# Patient Record
Sex: MALE | Race: WHITE
[De-identification: names, ages, dates, MRNs, and addresses within clinical notes are randomized per-mention and may not be internally consistent; named-entity substitution may affect disease eponyms.]

---

## 2017-09-13 ENCOUNTER — HOSPITAL ENCOUNTER (EMERGENCY)
Dept: HOSPITAL 76 - ED | Age: 72
Discharge: HOME | End: 2017-09-13
Payer: MEDICARE

## 2017-09-13 VITALS — SYSTOLIC BLOOD PRESSURE: 139 MMHG | DIASTOLIC BLOOD PRESSURE: 88 MMHG
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
## 2017-09-13 DIAGNOSIS — Z23: ICD-10-CM

## 2017-09-13 DIAGNOSIS — I10: ICD-10-CM
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
## 2017-09-13 DIAGNOSIS — S61.211A: Primary | ICD-10-CM

## 2017-09-13 DIAGNOSIS — Z79.82: ICD-10-CM

## 2017-09-13 DIAGNOSIS — S61.213A: ICD-10-CM
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
## 2017-09-13 PROCEDURE — 90715 TDAP VACCINE 7 YRS/> IM: CPT

## 2017-09-13 PROCEDURE — 12032 INTMD RPR S/A/T/EXT 2.6-7.5: CPT

## 2017-09-13 PROCEDURE — 90471 IMMUNIZATION ADMIN: CPT

## 2017-09-13 PROCEDURE — 73130 X-RAY EXAM OF HAND: CPT

## 2017-09-13 PROCEDURE — 99283 EMERGENCY DEPT VISIT LOW MDM: CPT

## 2017-09-13 NOTE — XRAY PRELIMINARY REPORT
Accession: [id number]

Exam: XR Hand 3 View LT

 

IMPRESSION: 

1. Negative for evidence of a fracture or radiopaque foreign body. 

2. Soft tissue injury distal index, long, and ring fingers.

 

[facility]

 

SITE ID: [id number]

## 2017-09-13 NOTE — XRAY REPORT
EXAM:

LEFT HAND RADIOGRAPHY

 

EXAM DATE: 9/13/2017 01:14 PM.

 

CLINICAL HISTORY: Injury lac. Table saw injury.

 

COMPARISON: None.

 

TECHNIQUE: 3 views.

 

FINDINGS: 

Bones: The bones appear to be intact without evidence of a fracture.

 

Joints: Normal. No subluxations.

 

Soft Tissues: There is soft tissue swelling and overlying bandage material in the distal index, long,
 and ring fingers. No radiopaque foreign bodies.

 

IMPRESSION: 

1. Negative for evidence of a fracture or radiopaque foreign body. 

2. Soft tissue injury distal index, long, and ring fingers.

 

[facility]

Referring Provider Line: [phone]

 

SITE ID: [id number]

## 2017-09-13 NOTE — ED PHYSICIAN DOCUMENTATION
PD HPI UPPER EXT INJURY





- Stated complaint


Stated Complaint: L HAND LAC





- Chief complaint


Chief Complaint: Laceration





- History obtained from


History obtained from: Patient





- History of Present Illness


Location: Other (Right-handed gentleman with unknown tetanus status cut the 

second third and fourth digits of the left hand just prior to arrival on a 

table saw while working at home.)





Review of Systems


Constitutional: reports: Reviewed and negative


Nose: reports: Reviewed and negative


Throat: reports: Reviewed and negative





PD PAST MEDICAL HISTORY





- Past Medical History


Cardiovascular: Hypertension





- Past Surgical History


Past Surgical History: Yes





- Present Medications


Home Medications: 


 Ambulatory Orders











 Medication  Instructions  Recorded  Confirmed


 


Aspirin [Aspir 81]  11/18/15 11/18/15


 


Lisinopril/Hydrochlorothiazide  BID 11/18/15 11/18/15





[Lisinopril-Hctz 20-25 mg Tab]   


 


Glucosamine Sulfate 500 mg PO 11/28/16 


 


Cephalexin [Keflex] 500 mg PO QID #40 capsule 09/13/17 














- Allergies


Allergies/Adverse Reactions: 


 Allergies











Allergy/AdvReac Type Severity Reaction Status Date / Time


 


No Known Drug Allergies Allergy   Verified 11/18/15 11:04














- Social History


Does the pt smoke?: No


Smoking Status: Never smoker


Does the pt have substance abuse?: No





- Immunizations


Immunizations are current?: Yes





PD ED PE NORMAL





- Vitals


Vital signs reviewed: Yes





- General


General: Alert and oriented X 3, No acute distress





- Extremities


Extremities: Other (Left hand: He has a shallow jagged laceration of the pulp 

of the fourth finger, a deeper laceration on the radial side of the middle 

finger and second finger, he has mildly diminished sensation on the tip of the 

middle finger but not absent.  Tendon function, both flexor and and extensor is 

intact in all digits.)





- Neuro


Neuro: Alert and oriented X 3, Normal speech





- Psych


Psych: Normal mood, Normal affect





Results





- Vitals


Vitals: 


 Vital Signs - 24 hr











  09/13/17





  12:23


 


Temperature 36.5 C


 


Heart Rate 74


 


Respiratory 18





Rate 


 


Blood Pressure 146/93 H


 


O2 Saturation 98








 Oxygen











O2 Source                      Room air

















Procedures





- Laceration (location)


  ** L HAND 2ND/3RD/4TH DIGITS


Length in cm: 5


Wound type: Other (He had contaminated lacerations of the second through fourth 

fingers, on exploration he does have a flexor tendon laceration at the 

insertion on the second digit.)


Anesthesia: Lidocaine 1%, With bicarb


Wound Preparation: Hibiclens, Irrigated copiously NS


Skin layer closure: Nylon, Interrupted, Size #-0 - enter number (4-0)


Other: Patient tolerated well, No complications, Neurovascular intact, Tetanus 

booster given


Complexity: Intermediate





PD MEDICAL DECISION MAKING





- ED course


ED course: 





On exploration during repair.  He has a visible laceration of the flexor tendon 

of the second digit at the level of the insertion onto the distal phalanx.  

This was discussed with him.  Function seems intact though.  The wound was 

debrided, irrigated and closed and he knows he needs to follow-up with hand 

surgeon.





Departure





- Departure


Disposition: 01 Home, Self Care


Clinical Impression: 


Laceration of multiple sites of hand and fingers


Qualifiers:


 Encounter type: initial encounter Laterality: left Qualified Code(s): S61.412A 

- Laceration without foreign body of left hand, initial encounter


Condition: Good


Record reviewed to determine appropriate education?: Yes


Instructions:  ED Laceration All


Prescriptions: 


Cephalexin [Keflex] 500 mg PO QID #40 capsule


Comments: 


You do have a laceration of the flexor tendon, partial, of the second digit at 

the insertion onto the distal phalanx.  This requires follow-up with a hand 

surgeon, the closest is in [place], call [phone] toDAY to schedule an 

appointment. The appointment should be within the week.  Keep the dressings on 

and dry until you follow-up.





Your blood pressure was elevated today on check into the emergency department.  

This does not mean that you have hypertension, it is a common phenomenon to 

come to the emergency department and have elevated blood pressure.  I recommend 

that she see your primary care physician within the week to have it rechecked 

when you are feeling better.

## 2017-09-20 ENCOUNTER — HOSPITAL ENCOUNTER (OUTPATIENT)
Dept: HOSPITAL 76 - LAB.F | Age: 72
Discharge: HOME | End: 2017-09-20
Attending: INTERNAL MEDICINE
Payer: MEDICARE

## 2017-09-20 DIAGNOSIS — F34.1: ICD-10-CM

## 2017-09-20 DIAGNOSIS — I10: ICD-10-CM

## 2017-09-20 DIAGNOSIS — R43.2: ICD-10-CM

## 2017-09-20 DIAGNOSIS — R53.83: Primary | ICD-10-CM

## 2017-09-20 DIAGNOSIS — R43.0: ICD-10-CM

## 2017-09-20 LAB
ALBUMIN/GLOB SERPL: 1.2 {RATIO} (ref 1–2.2)
ANION GAP SERPL CALCULATED.4IONS-SCNC: 8 MMOL/L (ref 6–13)
BASOPHILS NFR BLD AUTO: 0 10^3/UL (ref 0–0.1)
BASOPHILS NFR BLD AUTO: 0.8 %
BILIRUB BLD-MCNC: 0.9 MG/DL (ref 0.2–1)
BUN SERPL-MCNC: 19 MG/DL (ref 6–20)
CALCIUM UR-MCNC: 9.5 MG/DL (ref 8.5–10.3)
CHLORIDE SERPL-SCNC: 103 MMOL/L (ref 101–111)
CO2 SERPL-SCNC: 28 MMOL/L (ref 21–32)
CREAT SERPLBLD-SCNC: 0.8 MG/DL (ref 0.6–1.2)
EOSINOPHIL # BLD AUTO: 0.1 10^3/UL (ref 0–0.7)
EOSINOPHIL NFR BLD AUTO: 1.3 %
ERYTHROCYTE [DISTWIDTH] IN BLOOD BY AUTOMATED COUNT: 13.2 % (ref 12–15)
GFRSERPLBLD MDRD-ARVRAT: 95 ML/MIN/{1.73_M2} (ref 89–?)
GLOBULIN SER-MCNC: 3.6 G/DL (ref 2.1–4.2)
GLUCOSE SERPL-MCNC: 103 MG/DL (ref 70–100)
HCT VFR BLD AUTO: 46.2 % (ref 42–52)
HGB UR QL STRIP: 15.5 G/DL (ref 14–18)
LYMPHOCYTES # SPEC AUTO: 1.4 10^3/UL (ref 1.5–3.5)
LYMPHOCYTES NFR BLD AUTO: 27.5 %
MCH RBC QN AUTO: 33.3 PG (ref 27–31)
MCHC RBC AUTO-ENTMCNC: 33.5 G/DL (ref 32–36)
MCV RBC AUTO: 99.3 FL (ref 80–94)
MONOCYTES # BLD AUTO: 0.4 10^3/UL (ref 0–1)
MONOCYTES NFR BLD AUTO: 8.1 %
NEUTROPHILS # BLD AUTO: 3.2 10^3/UL (ref 1.5–6.6)
NEUTROPHILS # SNV AUTO: 5.1 X10^3/UL (ref 4.8–10.8)
NEUTROPHILS NFR BLD AUTO: 62.3 %
NRBC # BLD AUTO: 0.1 /100WBC
PDW BLD AUTO: 8.7 FL (ref 7.4–11.4)
POTASSIUM SERPL-SCNC: 4.2 MMOL/L (ref 3.5–5)
PROT SPEC-MCNC: 7.9 G/DL (ref 6.7–8.2)
RBC MAR: 4.66 10^6/UL (ref 4.7–6.1)
SODIUM SERPLBLD-SCNC: 139 MMOL/L (ref 135–145)
WBC # BLD: 5.1 X10^3/UL

## 2017-09-20 PROCEDURE — 85025 COMPLETE CBC W/AUTO DIFF WBC: CPT

## 2017-09-20 PROCEDURE — 80053 COMPREHEN METABOLIC PANEL: CPT

## 2017-09-20 PROCEDURE — 36415 COLL VENOUS BLD VENIPUNCTURE: CPT

## 2017-09-20 PROCEDURE — 84443 ASSAY THYROID STIM HORMONE: CPT

## 2018-02-08 ENCOUNTER — HOSPITAL ENCOUNTER (OUTPATIENT)
Dept: HOSPITAL 76 - LAB.F | Age: 73
Discharge: HOME | End: 2018-02-08
Attending: INTERNAL MEDICINE
Payer: MEDICARE

## 2018-02-08 DIAGNOSIS — E78.5: Primary | ICD-10-CM

## 2018-02-08 LAB
CHOLEST SERPL-MCNC: 263 MG/DL
HDLC SERPL-MCNC: 66 MG/DL
HDLC SERPL: 4 {RATIO} (ref ?–5)
LDLC SERPL CALC-MCNC: 159 MG/DL
LDLC/HDLC SERPL: 2.4 {RATIO} (ref ?–3.6)
VLDLC SERPL-SCNC: 38 MG/DL

## 2018-02-08 PROCEDURE — 83721 ASSAY OF BLOOD LIPOPROTEIN: CPT

## 2018-02-08 PROCEDURE — 36415 COLL VENOUS BLD VENIPUNCTURE: CPT

## 2018-02-08 PROCEDURE — 80061 LIPID PANEL: CPT

## 2018-03-06 ENCOUNTER — HOSPITAL ENCOUNTER (OUTPATIENT)
Dept: HOSPITAL 76 - SC | Age: 73
Discharge: HOME | End: 2018-03-06
Attending: INTERNAL MEDICINE
Payer: MEDICARE

## 2018-03-06 DIAGNOSIS — G47.8: ICD-10-CM

## 2018-03-06 DIAGNOSIS — R06.83: ICD-10-CM

## 2018-03-06 DIAGNOSIS — G47.10: Primary | ICD-10-CM

## 2018-03-06 PROCEDURE — 99212 OFFICE O/P EST SF 10 MIN: CPT

## 2018-03-06 PROCEDURE — 99203 OFFICE O/P NEW LOW 30 MIN: CPT

## 2018-03-19 ENCOUNTER — HOSPITAL ENCOUNTER (OUTPATIENT)
Dept: HOSPITAL 76 - LAB.F | Age: 73
Discharge: HOME | End: 2018-03-19
Attending: INTERNAL MEDICINE
Payer: MEDICARE

## 2018-03-19 DIAGNOSIS — G47.10: ICD-10-CM

## 2018-03-19 DIAGNOSIS — Z86.010: ICD-10-CM

## 2018-03-19 DIAGNOSIS — Z85.46: ICD-10-CM

## 2018-03-19 DIAGNOSIS — E78.5: ICD-10-CM

## 2018-03-19 DIAGNOSIS — I10: ICD-10-CM

## 2018-03-19 DIAGNOSIS — R53.83: Primary | ICD-10-CM

## 2018-03-19 DIAGNOSIS — R73.01: ICD-10-CM

## 2018-03-19 LAB
ANION GAP SERPL CALCULATED.4IONS-SCNC: 9 MMOL/L (ref 6–13)
BUN SERPL-MCNC: 15 MG/DL (ref 6–20)
CALCIUM UR-MCNC: 9.1 MG/DL (ref 8.5–10.3)
CHLORIDE SERPL-SCNC: 99 MMOL/L (ref 101–111)
CO2 SERPL-SCNC: 26 MMOL/L (ref 21–32)
CREAT SERPLBLD-SCNC: 0.9 MG/DL (ref 0.6–1.2)
GFRSERPLBLD MDRD-ARVRAT: 83 ML/MIN/{1.73_M2} (ref 89–?)
GLUCOSE SERPL-MCNC: 95 MG/DL (ref 70–100)
SODIUM SERPLBLD-SCNC: 134 MMOL/L (ref 135–145)

## 2018-03-19 PROCEDURE — 36415 COLL VENOUS BLD VENIPUNCTURE: CPT

## 2018-03-19 PROCEDURE — 80048 BASIC METABOLIC PNL TOTAL CA: CPT

## 2018-04-09 ENCOUNTER — HOSPITAL ENCOUNTER (OUTPATIENT)
Dept: HOSPITAL 76 - RT.S | Age: 73
Discharge: HOME | End: 2018-04-09
Attending: INTERNAL MEDICINE
Payer: MEDICARE

## 2018-04-09 DIAGNOSIS — I49.9: Primary | ICD-10-CM

## 2018-04-09 PROCEDURE — 93005 ELECTROCARDIOGRAM TRACING: CPT

## 2018-04-21 ENCOUNTER — HOSPITAL ENCOUNTER (OUTPATIENT)
Dept: HOSPITAL 76 - SC | Age: 73
Discharge: HOME | End: 2018-04-21
Attending: INTERNAL MEDICINE
Payer: MEDICARE

## 2018-04-21 DIAGNOSIS — G47.61: Primary | ICD-10-CM

## 2018-04-21 PROCEDURE — 95810 POLYSOM 6/> YRS 4/> PARAM: CPT

## 2018-05-07 ENCOUNTER — HOSPITAL ENCOUNTER (OUTPATIENT)
Dept: HOSPITAL 76 - SC | Age: 73
Discharge: HOME | End: 2018-05-07
Attending: INTERNAL MEDICINE
Payer: MEDICARE

## 2018-05-07 DIAGNOSIS — G47.61: Primary | ICD-10-CM

## 2018-05-07 PROCEDURE — 99213 OFFICE O/P EST LOW 20 MIN: CPT

## 2018-05-07 PROCEDURE — 99212 OFFICE O/P EST SF 10 MIN: CPT

## 2018-08-01 ENCOUNTER — HOSPITAL ENCOUNTER (OUTPATIENT)
Dept: HOSPITAL 76 - LAB.F | Age: 73
Discharge: HOME | End: 2018-08-01
Attending: INTERNAL MEDICINE
Payer: MEDICARE

## 2018-08-01 DIAGNOSIS — R53.83: ICD-10-CM

## 2018-08-01 DIAGNOSIS — E78.5: ICD-10-CM

## 2018-08-01 DIAGNOSIS — I10: Primary | ICD-10-CM

## 2018-08-01 DIAGNOSIS — R73.01: ICD-10-CM

## 2018-08-01 DIAGNOSIS — Z86.010: ICD-10-CM

## 2018-08-01 DIAGNOSIS — Z85.46: ICD-10-CM

## 2018-08-01 DIAGNOSIS — G47.10: ICD-10-CM

## 2018-08-01 LAB
CHOLEST SERPL-MCNC: 204 MG/DL
HDLC SERPL-MCNC: 65 MG/DL
HDLC SERPL: 3.1 {RATIO} (ref ?–5)
LDLC SERPL CALC-MCNC: 121 MG/DL
LDLC/HDLC SERPL: 1.9 {RATIO} (ref ?–3.6)
VLDLC SERPL-SCNC: 18 MG/DL

## 2018-08-01 PROCEDURE — 36415 COLL VENOUS BLD VENIPUNCTURE: CPT

## 2018-08-01 PROCEDURE — 83721 ASSAY OF BLOOD LIPOPROTEIN: CPT

## 2018-08-01 PROCEDURE — 80061 LIPID PANEL: CPT

## 2019-03-13 ENCOUNTER — HOSPITAL ENCOUNTER (OUTPATIENT)
Dept: HOSPITAL 76 - LAB.F | Age: 74
Discharge: HOME | End: 2019-03-13
Attending: INTERNAL MEDICINE
Payer: MEDICARE

## 2019-03-13 DIAGNOSIS — E78.5: Primary | ICD-10-CM

## 2019-03-13 LAB
ALBUMIN DIAFP-MCNC: 4 G/DL (ref 3.2–5.5)
ALBUMIN/GLOB SERPL: 1.3 {RATIO} (ref 1–2.2)
ALP SERPL-CCNC: 46 IU/L (ref 42–121)
ALT SERPL W P-5'-P-CCNC: 24 IU/L (ref 10–60)
ANION GAP SERPL CALCULATED.4IONS-SCNC: 9 MMOL/L (ref 6–13)
AST SERPL W P-5'-P-CCNC: 29 IU/L (ref 10–42)
BILIRUB BLD-MCNC: 1.1 MG/DL (ref 0.2–1)
BUN SERPL-MCNC: 16 MG/DL (ref 6–20)
CALCIUM UR-MCNC: 8.9 MG/DL (ref 8.5–10.3)
CHLORIDE SERPL-SCNC: 102 MMOL/L (ref 101–111)
CHOLEST SERPL-MCNC: 223 MG/DL
CO2 SERPL-SCNC: 27 MMOL/L (ref 21–32)
CREAT SERPLBLD-SCNC: 0.8 MG/DL (ref 0.6–1.2)
GFRSERPLBLD MDRD-ARVRAT: 95 ML/MIN/{1.73_M2} (ref 89–?)
GLOBULIN SER-MCNC: 3 G/DL (ref 2.1–4.2)
GLUCOSE SERPL-MCNC: 101 MG/DL (ref 70–100)
HDLC SERPL-MCNC: 84 MG/DL
HDLC SERPL: 2.7 {RATIO} (ref ?–5)
LDLC SERPL CALC-MCNC: 119 MG/DL
LDLC/HDLC SERPL: 1.4 {RATIO} (ref ?–3.6)
PROT SPEC-MCNC: 7 G/DL (ref 6.7–8.2)
SODIUM SERPLBLD-SCNC: 138 MMOL/L (ref 135–145)
VLDLC SERPL-SCNC: 20 MG/DL

## 2019-03-13 PROCEDURE — 36415 COLL VENOUS BLD VENIPUNCTURE: CPT

## 2019-03-13 PROCEDURE — 80053 COMPREHEN METABOLIC PANEL: CPT

## 2019-03-13 PROCEDURE — 80061 LIPID PANEL: CPT

## 2019-03-13 PROCEDURE — 83721 ASSAY OF BLOOD LIPOPROTEIN: CPT

## 2020-07-02 ENCOUNTER — HOSPITAL ENCOUNTER (OUTPATIENT)
Dept: HOSPITAL 76 - LAB.S | Age: 75
Discharge: HOME | End: 2020-07-02
Attending: INTERNAL MEDICINE
Payer: MEDICARE

## 2020-07-02 DIAGNOSIS — R73.01: ICD-10-CM

## 2020-07-02 DIAGNOSIS — I10: Primary | ICD-10-CM

## 2020-07-02 LAB
ANION GAP SERPL CALCULATED.4IONS-SCNC: 8 MMOL/L (ref 6–13)
BUN SERPL-MCNC: 17 MG/DL (ref 6–20)
CALCIUM UR-MCNC: 9 MG/DL (ref 8.5–10.3)
CHLORIDE SERPL-SCNC: 102 MMOL/L (ref 101–111)
CO2 SERPL-SCNC: 27 MMOL/L (ref 21–32)
CREAT SERPLBLD-SCNC: 0.9 MG/DL (ref 0.6–1.2)
EST. AVERAGE GLUCOSE BLD GHB EST-MCNC: 117 MG/DL (ref 70–100)
GLUCOSE SERPL-MCNC: 100 MG/DL (ref 70–100)
HB2 TOTAL: 16.3 G/DL
HBA1C BLD-MCNC: 0.64 G/DL
HEMOGLOBIN A1C %: 5.7 % (ref 4.6–6.2)
SODIUM SERPLBLD-SCNC: 137 MMOL/L (ref 135–145)

## 2020-07-02 PROCEDURE — 36415 COLL VENOUS BLD VENIPUNCTURE: CPT

## 2020-07-02 PROCEDURE — 80048 BASIC METABOLIC PNL TOTAL CA: CPT

## 2020-07-02 PROCEDURE — 83036 HEMOGLOBIN GLYCOSYLATED A1C: CPT

## 2021-03-24 ENCOUNTER — HOSPITAL ENCOUNTER (OUTPATIENT)
Dept: HOSPITAL 76 - LAB.S | Age: 76
Discharge: HOME | End: 2021-03-24
Attending: INTERNAL MEDICINE
Payer: MEDICARE

## 2021-03-24 DIAGNOSIS — E78.5: ICD-10-CM

## 2021-03-24 DIAGNOSIS — I10: Primary | ICD-10-CM

## 2021-03-24 LAB
ALBUMIN DIAFP-MCNC: 4.2 G/DL (ref 3.2–5.5)
ALBUMIN/GLOB SERPL: 1.3 {RATIO} (ref 1–2.2)
ALP SERPL-CCNC: 43 IU/L (ref 42–121)
ALT SERPL W P-5'-P-CCNC: 26 IU/L (ref 10–60)
ANION GAP SERPL CALCULATED.4IONS-SCNC: 8 MMOL/L (ref 6–13)
AST SERPL W P-5'-P-CCNC: 27 IU/L (ref 10–42)
BASOPHILS NFR BLD AUTO: 0 10^3/UL (ref 0–0.1)
BASOPHILS NFR BLD AUTO: 0.8 %
BILIRUB BLD-MCNC: 1 MG/DL (ref 0.2–1)
BUN SERPL-MCNC: 17 MG/DL (ref 6–20)
CALCIUM UR-MCNC: 9.2 MG/DL (ref 8.5–10.3)
CHLORIDE SERPL-SCNC: 105 MMOL/L (ref 101–111)
CHOLEST SERPL-MCNC: 246 MG/DL
CO2 SERPL-SCNC: 27 MMOL/L (ref 21–32)
CREAT SERPLBLD-SCNC: 0.8 MG/DL (ref 0.6–1.2)
EOSINOPHIL # BLD AUTO: 0.1 10^3/UL (ref 0–0.7)
EOSINOPHIL NFR BLD AUTO: 2.1 %
ERYTHROCYTE [DISTWIDTH] IN BLOOD BY AUTOMATED COUNT: 12.8 % (ref 12–15)
GFRSERPLBLD MDRD-ARVRAT: 94 ML/MIN/{1.73_M2} (ref 89–?)
GLOBULIN SER-MCNC: 3.2 G/DL (ref 2.1–4.2)
GLUCOSE SERPL-MCNC: 102 MG/DL (ref 70–100)
HCT VFR BLD AUTO: 46.7 % (ref 42–52)
HDLC SERPL-MCNC: 64 MG/DL
HDLC SERPL: 3.8 {RATIO} (ref ?–5)
HGB UR QL STRIP: 15.5 G/DL (ref 14–18)
LDLC SERPL CALC-MCNC: 157 MG/DL
LDLC/HDLC SERPL: 2.5 {RATIO} (ref ?–3.6)
LYMPHOCYTES # SPEC AUTO: 1.3 10^3/UL (ref 1.5–3.5)
LYMPHOCYTES NFR BLD AUTO: 33.2 %
MCH RBC QN AUTO: 33.4 PG (ref 27–31)
MCHC RBC AUTO-ENTMCNC: 33.2 G/DL (ref 32–36)
MCV RBC AUTO: 100.6 FL (ref 80–94)
MONOCYTES # BLD AUTO: 0.3 10^3/UL (ref 0–1)
MONOCYTES NFR BLD AUTO: 8.5 %
NEUTROPHILS # BLD AUTO: 2.2 10^3/UL (ref 1.5–6.6)
NEUTROPHILS # SNV AUTO: 3.9 X10^3/UL (ref 4.8–10.8)
NEUTROPHILS NFR BLD AUTO: 55.1 %
NRBC # BLD AUTO: 0 /100WBC
NRBC # BLD AUTO: 0 X10^3/UL
PDW BLD AUTO: 10.7 FL (ref 7.4–11.4)
PLATELET # BLD: 194 10^3/UL (ref 130–450)
POTASSIUM SERPL-SCNC: 4 MMOL/L (ref 3.5–5)
PROT SPEC-MCNC: 7.4 G/DL (ref 6.7–8.2)
RBC MAR: 4.64 10^6/UL (ref 4.7–6.1)
SODIUM SERPLBLD-SCNC: 140 MMOL/L (ref 135–145)
TRIGL P FAST SERPL-MCNC: 127 MG/DL
VLDLC SERPL-SCNC: 25 MG/DL

## 2021-03-24 PROCEDURE — 83721 ASSAY OF BLOOD LIPOPROTEIN: CPT

## 2021-03-24 PROCEDURE — 36415 COLL VENOUS BLD VENIPUNCTURE: CPT

## 2021-03-24 PROCEDURE — 80053 COMPREHEN METABOLIC PANEL: CPT

## 2021-03-24 PROCEDURE — 80061 LIPID PANEL: CPT

## 2021-03-24 PROCEDURE — 85025 COMPLETE CBC W/AUTO DIFF WBC: CPT

## 2021-04-14 ENCOUNTER — HOSPITAL ENCOUNTER (OUTPATIENT)
Dept: HOSPITAL 76 - DI | Age: 76
Discharge: HOME | End: 2021-04-14
Attending: INTERNAL MEDICINE
Payer: MEDICARE

## 2021-04-14 DIAGNOSIS — R01.1: Primary | ICD-10-CM

## 2021-04-14 DIAGNOSIS — I51.7: ICD-10-CM

## 2021-04-14 PROCEDURE — 93306 TTE W/DOPPLER COMPLETE: CPT

## 2021-10-05 ENCOUNTER — HOSPITAL ENCOUNTER (OUTPATIENT)
Dept: HOSPITAL 76 - LAB.S | Age: 76
Discharge: HOME | End: 2021-10-05
Attending: INTERNAL MEDICINE
Payer: MEDICARE

## 2021-10-05 DIAGNOSIS — I10: Primary | ICD-10-CM

## 2021-10-05 DIAGNOSIS — E78.5: ICD-10-CM

## 2021-10-05 LAB
ALBUMIN DIAFP-MCNC: 4.1 G/DL (ref 3.2–5.5)
ALBUMIN/GLOB SERPL: 1.3 {RATIO} (ref 1–2.2)
ALP SERPL-CCNC: 44 IU/L (ref 42–121)
ALT SERPL W P-5'-P-CCNC: 23 IU/L (ref 10–60)
ANION GAP SERPL CALCULATED.4IONS-SCNC: 10 MMOL/L (ref 6–13)
AST SERPL W P-5'-P-CCNC: 25 IU/L (ref 10–42)
BASOPHILS NFR BLD AUTO: 0.1 10^3/UL (ref 0–0.1)
BASOPHILS NFR BLD AUTO: 1.5 %
BILIRUB BLD-MCNC: 1 MG/DL (ref 0.2–1)
BUN SERPL-MCNC: 14 MG/DL (ref 6–20)
CALCIUM UR-MCNC: 9.3 MG/DL (ref 8.5–10.3)
CHLORIDE SERPL-SCNC: 103 MMOL/L (ref 101–111)
CHOLEST SERPL-MCNC: 228 MG/DL
CO2 SERPL-SCNC: 28 MMOL/L (ref 21–32)
CREAT SERPLBLD-SCNC: 0.8 MG/DL (ref 0.6–1.2)
EOSINOPHIL # BLD AUTO: 0.1 10^3/UL (ref 0–0.7)
EOSINOPHIL NFR BLD AUTO: 2.4 %
ERYTHROCYTE [DISTWIDTH] IN BLOOD BY AUTOMATED COUNT: 12.6 % (ref 12–15)
GFRSERPLBLD MDRD-ARVRAT: 94 ML/MIN/{1.73_M2} (ref 89–?)
GLOBULIN SER-MCNC: 3.1 G/DL (ref 2.1–4.2)
GLUCOSE SERPL-MCNC: 100 MG/DL (ref 70–100)
HCT VFR BLD AUTO: 47.4 % (ref 42–52)
HDLC SERPL-MCNC: 65 MG/DL
HDLC SERPL: 3.5 {RATIO} (ref ?–5)
HGB UR QL STRIP: 15.5 G/DL (ref 14–18)
LDLC SERPL CALC-MCNC: 137 MG/DL
LDLC/HDLC SERPL: 2.1 {RATIO} (ref ?–3.6)
LYMPHOCYTES # SPEC AUTO: 1.3 10^3/UL (ref 1.5–3.5)
LYMPHOCYTES NFR BLD AUTO: 32.3 %
MCH RBC QN AUTO: 33.1 PG (ref 27–31)
MCHC RBC AUTO-ENTMCNC: 32.7 G/DL (ref 32–36)
MCV RBC AUTO: 101.3 FL (ref 80–94)
MONOCYTES # BLD AUTO: 0.3 10^3/UL (ref 0–1)
MONOCYTES NFR BLD AUTO: 8.3 %
NEUTROPHILS # BLD AUTO: 2.2 10^3/UL (ref 1.5–6.6)
NEUTROPHILS # SNV AUTO: 4.1 X10^3/UL (ref 4.8–10.8)
NEUTROPHILS NFR BLD AUTO: 54.8 %
NRBC # BLD AUTO: 0 /100WBC
NRBC # BLD AUTO: 0 X10^3/UL
PDW BLD AUTO: 10.6 FL (ref 7.4–11.4)
PLATELET # BLD: 198 10^3/UL (ref 130–450)
POTASSIUM SERPL-SCNC: 3.8 MMOL/L (ref 3.5–5)
PROT SPEC-MCNC: 7.2 G/DL (ref 6.7–8.2)
RBC MAR: 4.68 10^6/UL (ref 4.7–6.1)
SODIUM SERPLBLD-SCNC: 141 MMOL/L (ref 135–145)
TRIGL P FAST SERPL-MCNC: 132 MG/DL
VLDLC SERPL-SCNC: 26 MG/DL

## 2021-10-05 PROCEDURE — 83721 ASSAY OF BLOOD LIPOPROTEIN: CPT

## 2021-10-05 PROCEDURE — 36415 COLL VENOUS BLD VENIPUNCTURE: CPT

## 2021-10-05 PROCEDURE — 80053 COMPREHEN METABOLIC PANEL: CPT

## 2021-10-05 PROCEDURE — 85025 COMPLETE CBC W/AUTO DIFF WBC: CPT

## 2021-10-05 PROCEDURE — 80061 LIPID PANEL: CPT

## 2021-12-17 ENCOUNTER — HOSPITAL ENCOUNTER (OUTPATIENT)
Dept: HOSPITAL 76 - DI | Age: 76
Discharge: HOME | End: 2021-12-17
Attending: INTERNAL MEDICINE
Payer: MEDICARE

## 2021-12-17 DIAGNOSIS — M47.812: ICD-10-CM

## 2021-12-17 DIAGNOSIS — M54.2: Primary | ICD-10-CM

## 2021-12-17 NOTE — XRAY REPORT
PROCEDURE:  Cervical Spine w/Flex/Ext

 

INDICATIONS:  CERVICALGIA

 

TECHNIQUE:  9 views of the cervical spine were acquired.  

 

COMPARISON: None.

 

FINDINGS:  

 

Bones:  No fractures or dislocations to the C7 level. There is moderate to severe degenerative change
 in the cervical spine. This is demonstrable by intervertebral disc space height loss, prominent oste
ophytes, and uncovertebral joint hypertrophy. There is neural foraminal narrowing most pronounced on 
the left at C3-C4. No suspicious bony lesions.  There is decreased range of motion between flexion an
d extension, with preserved normal bony alignment.

 

Soft tissues:  Prevertebral soft tissues are normal in thickness.  

 

IMPRESSION:  

Moderate to severe cervical spine degenerative change.

Neuroforaminal narrowing most pronounced at the left C3-C4.

Decreased range of motion.

 

Reviewed by: Roshan Eugene MD on 12/17/2021 2:33 PM PST

Approved by: Roshan Eugene MD on 12/17/2021 2:33 PM PST

 

 

Station ID:  [id number]

## 2022-01-10 ENCOUNTER — HOSPITAL ENCOUNTER (OUTPATIENT)
Dept: HOSPITAL 76 - DI | Age: 77
Discharge: HOME | End: 2022-01-10
Attending: INTERNAL MEDICINE
Payer: MEDICARE

## 2022-01-10 DIAGNOSIS — M47.812: Primary | ICD-10-CM

## 2022-01-10 DIAGNOSIS — M48.02: ICD-10-CM

## 2022-01-10 DIAGNOSIS — M48.03: ICD-10-CM

## 2022-01-10 NOTE — MRI REPORT
PROCEDURE:  Cervical Spine W/O

 

INDICATIONS:  CERVICALGIA

 

TECHNIQUE:  

Noncontrast sagittal T1 spin echo and T2 fast spin echo, sagittal STIR, foraminal oblique sagittal T2
 fast spin echo, and axial gradient echo or T2 fast spin echo through the cervical spine.  

 

COMPARISON:  None.

 

FINDINGS:  

Straightening of usual cervical lordosis. Otherwise normal alignment. Vertebral body heights maintain
ed. No suspicious focal marrow signal abnormality or significant marrow edema identified. Normal morp
hology and signal intensity of the cervical cord. There is no syrinx. The regional prevertebral and p
araspinal soft tissues are grossly unremarkable in the absence of IV contrast.

 

C2-C3:  No spinal canal or neural foraminal stenosis.  

 

C3-C4:   No spinal canal stenosis. Facet and uncovertebral hypertrophy contribute to moderate left an
d mild right neural foraminal stenosis.

 

C4-C5:  No spinal canal stenosis. Facet and uncovertebral hypertrophy contribute to severe right and 
moderate left neural foraminal stenosis.

 

C5-C6:  No spinal canal stenosis. Mild bilateral neural foraminal narrowing due to facet and uncovert
ebral hypertrophy.

 

C6-C7:  No spinal canal stenosis. Moderate left and mild right neural foraminal stenosis due to facet
 and uncovertebral hypertrophy.

 

C7-T1:  No spinal canal stenosis. Mild neural foraminal narrowing on the left due to uncovertebral sp
urring.

 

 

IMPRESSION:  

Multilevel multifactorial degenerative changes with severe neural foraminal narrowing on the right at
 C4-C5 along with moderate neural foraminal narrowing on the left at C3-C4 and C4-C5.

 

No spinal canal stenosis identified.

 

Reviewed by: Onel Hutchins MD on 1/10/2022 5:06 PM [facility]

Approved by: Onel Hutchins MD on 1/10/2022 5:06 PM [facility]

 

 

Station ID:  [id number]

## 2022-04-11 ENCOUNTER — HOSPITAL ENCOUNTER (OUTPATIENT)
Dept: HOSPITAL 76 - LAB.S | Age: 77
Discharge: HOME | End: 2022-04-11
Attending: INTERNAL MEDICINE
Payer: MEDICARE

## 2022-04-11 DIAGNOSIS — I10: Primary | ICD-10-CM

## 2022-04-11 DIAGNOSIS — E78.5: ICD-10-CM

## 2022-04-11 LAB
ALBUMIN DIAFP-MCNC: 4.1 G/DL (ref 3.2–5.5)
ALBUMIN/GLOB SERPL: 1.2 {RATIO} (ref 1–2.2)
ALP SERPL-CCNC: 42 IU/L (ref 42–121)
ALT SERPL W P-5'-P-CCNC: 23 IU/L (ref 10–60)
ANION GAP SERPL CALCULATED.4IONS-SCNC: 12 MMOL/L (ref 6–13)
AST SERPL W P-5'-P-CCNC: 24 IU/L (ref 10–42)
BASOPHILS NFR BLD AUTO: 0 10^3/UL (ref 0–0.1)
BASOPHILS NFR BLD AUTO: 0.9 %
BILIRUB BLD-MCNC: 1.2 MG/DL (ref 0.2–1)
BUN SERPL-MCNC: 19 MG/DL (ref 6–20)
CALCIUM UR-MCNC: 9.5 MG/DL (ref 8.5–10.3)
CHLORIDE SERPL-SCNC: 100 MMOL/L (ref 101–111)
CHOLEST SERPL-MCNC: 260 MG/DL
CO2 SERPL-SCNC: 27 MMOL/L (ref 21–32)
CREAT SERPLBLD-SCNC: 0.9 MG/DL (ref 0.6–1.2)
EOSINOPHIL # BLD AUTO: 0.1 10^3/UL (ref 0–0.7)
EOSINOPHIL NFR BLD AUTO: 2.1 %
ERYTHROCYTE [DISTWIDTH] IN BLOOD BY AUTOMATED COUNT: 12.7 % (ref 12–15)
GFRSERPLBLD MDRD-ARVRAT: 82 ML/MIN/{1.73_M2} (ref 89–?)
GLOBULIN SER-MCNC: 3.4 G/DL (ref 2.1–4.2)
GLUCOSE SERPL-MCNC: 100 MG/DL (ref 70–100)
HCT VFR BLD AUTO: 47.6 % (ref 42–52)
HDLC SERPL-MCNC: 63 MG/DL
HDLC SERPL: 4.1 {RATIO} (ref ?–5)
HGB UR QL STRIP: 15.6 G/DL (ref 14–18)
LDLC SERPL CALC-MCNC: 165 MG/DL
LDLC/HDLC SERPL: 2.6 {RATIO} (ref ?–3.6)
LYMPHOCYTES # SPEC AUTO: 1.5 10^3/UL (ref 1.5–3.5)
LYMPHOCYTES NFR BLD AUTO: 33.6 %
MCH RBC QN AUTO: 32.3 PG (ref 27–31)
MCHC RBC AUTO-ENTMCNC: 32.8 G/DL (ref 32–36)
MCV RBC AUTO: 98.6 FL (ref 80–94)
MONOCYTES # BLD AUTO: 0.4 10^3/UL (ref 0–1)
MONOCYTES NFR BLD AUTO: 9.3 %
NEUTROPHILS # BLD AUTO: 2.3 10^3/UL (ref 1.5–6.6)
NEUTROPHILS # SNV AUTO: 4.3 X10^3/UL (ref 4.8–10.8)
NEUTROPHILS NFR BLD AUTO: 53.6 %
NRBC # BLD AUTO: 0 /100WBC
NRBC # BLD AUTO: 0 X10^3/UL
PDW BLD AUTO: 10.3 FL (ref 7.4–11.4)
PLATELET # BLD: 199 10^3/UL (ref 130–450)
POTASSIUM SERPL-SCNC: 4.2 MMOL/L (ref 3.5–5)
PROT SPEC-MCNC: 7.5 G/DL (ref 6.7–8.2)
RBC MAR: 4.83 10^6/UL (ref 4.7–6.1)
SODIUM SERPLBLD-SCNC: 139 MMOL/L (ref 135–145)
TRIGL P FAST SERPL-MCNC: 160 MG/DL
VLDLC SERPL-SCNC: 32 MG/DL

## 2022-04-11 PROCEDURE — 85025 COMPLETE CBC W/AUTO DIFF WBC: CPT

## 2022-04-11 PROCEDURE — 36415 COLL VENOUS BLD VENIPUNCTURE: CPT

## 2022-04-11 PROCEDURE — 80061 LIPID PANEL: CPT

## 2022-04-11 PROCEDURE — 83721 ASSAY OF BLOOD LIPOPROTEIN: CPT

## 2022-04-11 PROCEDURE — 80053 COMPREHEN METABOLIC PANEL: CPT

## 2022-05-20 ENCOUNTER — HOSPITAL ENCOUNTER (OUTPATIENT)
Dept: HOSPITAL 76 - SDS | Age: 77
Discharge: HOME | End: 2022-05-20
Attending: SURGERY
Payer: MEDICARE

## 2022-05-20 VITALS — SYSTOLIC BLOOD PRESSURE: 133 MMHG | DIASTOLIC BLOOD PRESSURE: 72 MMHG

## 2022-05-20 DIAGNOSIS — Z12.11: Primary | ICD-10-CM

## 2022-05-20 DIAGNOSIS — D12.2: ICD-10-CM

## 2022-05-20 DIAGNOSIS — Z79.899: ICD-10-CM

## 2022-05-20 DIAGNOSIS — D12.3: ICD-10-CM

## 2022-05-20 DIAGNOSIS — E66.9: ICD-10-CM

## 2022-05-20 PROCEDURE — 45380 COLONOSCOPY AND BIOPSY: CPT

## 2022-05-20 PROCEDURE — 0DBL8ZZ EXCISION OF TRANSVERSE COLON, VIA NATURAL OR ARTIFICIAL OPENING ENDOSCOPIC: ICD-10-PCS | Performed by: SURGERY

## 2022-05-20 PROCEDURE — 0DBK8ZZ EXCISION OF ASCENDING COLON, VIA NATURAL OR ARTIFICIAL OPENING ENDOSCOPIC: ICD-10-PCS | Performed by: SURGERY

## 2022-05-20 NOTE — ANESTHESIA POST OP EVALUATION
Anesthesia Post Eval





- Post Anesthesia Eval


Vitals: 





                                Last Vital Signs











Temp  37.2 C   05/20/22 08:17


 


Pulse  72   05/20/22 08:17


 


Resp  20   05/20/22 08:17


 


BP  109/59 L  05/20/22 08:17


 


Pulse Ox  97   05/20/22 08:17











CV Function Including HR & BP: Stable


Pain Control: Satisfactory


Nausea & Vomiting: Negative


Mental Status: Baseline


Respiratory Status: Airway Patent


Hydration Status: Satisfactory


Anesthesia Complications: None

## 2022-05-20 NOTE — ANESTHESIA
Pre-Anesthesia VS, & Labs





- Diagnosis





hx of colon polyps





- Procedure





colonoscopy


Vital Signs: 





                                        











Temp Pulse Resp BP Pulse Ox


 


 36.7 C   88   14   129/92 H   


 


 22 06:30  22 06:30  22 06:30  22 06:30   











Height: 6 ft


Weight (kg): 101 kg


Body Mass Index: 30.2


BMI Classification: Obese





- NPO


>8 hours





- Lab Results


Lab results reviewed: Yes





Home Medications and Allergies


Home Medications: 


Ambulatory Orders





Cholecalciferol (Vitamin D3) [Vitamin D3] 25 mg PO DAILY 22 


Loratadine [Claritin] 10 mg PO DAILY 22 











                                        





Calcium Carbonate [Calcium] 600 mg PO DAILY 22 


glucosamine HCL [Glucosamine HCl] 1,500 mg PO DAILY 22 


hydroCHLOROthiazide [Hydrodiuril] 25 mg PO DAILY 22 


lisinopriL [Lisinopril] 40 mg PO DAILY 22 


Cholecalciferol (Vitamin D3) [Vitamin D3] 25 mg PO DAILY 22 


Loratadine [Claritin] 10 mg PO DAILY 22 








Allergies/Adverse Reactions: 


                                    Allergies











Allergy/AdvReac Type Severity Reaction Status Date / Time


 


amoxicillin Allergy  Rash Verified 22 06:52














Anes History & Medical History





- Anesthetic History


Anesthesia Complications: reports: No previous complications


Family history of Anesthesia Complications: Denies


Family history of Malignant Hyperthermia: Denies





- Medical History


Cardiovascular: reports: Hypertension


Smoking Status: Never smoker





- Surgical History


General: reports: Colonoscopy


Urologic: reports: Prostatic surgery





Results





- Echo Results


Echo Results: Report reviewed





Exam


General: Alert, Oriented x3, Cooperative, No acute distress


Dental: Poor dentition


Mouth Openin Fingerbreadth


Neck Mobility: Normal


Mallampati classification: II





Plan


Anesthesia Type: General, Total IV


Consent for Procedure(s) Verified and Reviewed: Yes


Code Status: Attempt Resuscitation


ASA classification: 2-Mild systemic disease


Is this case an emergency?: No

## 2022-05-20 NOTE — HISTORY & PHYSICAL EXAMINATION
Chief Complaint





- Chief Complaint


Chief Complaint: history colon polyps





History of Present Illness





- History Obtained From


Records Reviewed: yes


Exam Limitations: none





- History of Present Illness


HPI Comment/Other: 





history colon polyps 2 of the last 3 colonoscopies.  last colonoscopy 5 years 

ago no polyps.  his gastroenterologist recommends a colonoscopy at this time.  

no problems





History





- Past Medical History


Cardiovascular: reports: Hypertension


MRSA Hx?: No





- Past Surgical History


General: reports: Colonoscopy





- POLST


Patient has POLST: No





Meds/Allgy





- Home Medications


Home Medications: 


                                Ambulatory Orders











 Medication  Instructions  Recorded  Confirmed


 


Calcium Carbonate [Calcium] 600 mg PO DAILY 03/02/22 05/19/22


 


glucosamine HCL [Glucosamine HCl] 1,500 mg PO DAILY 03/02/22 05/19/22


 


hydroCHLOROthiazide [Hydrodiuril] 25 mg PO DAILY 03/02/22 05/19/22


 


lisinopriL [Lisinopril] 40 mg PO DAILY 03/02/22 05/19/22


 


Cholecalciferol (Vitamin D3) 25 mg PO DAILY 05/20/22 05/20/22





[Vitamin D3]   


 


Loratadine [Claritin] 10 mg PO DAILY 05/20/22 05/20/22














- Allergies


Allergies/Adverse Reactions: 


                                    Allergies











Allergy/AdvReac Type Severity Reaction Status Date / Time


 


amoxicillin Allergy  Rash Verified 05/20/22 06:52














Review of Systems





- Other Findings


Other Findings: 





10 pt ros as above otherwise unremarkable





Exam





- Vital Signs


Reviewed Vital Signs: Yes


Vital Signs: 





                                Vital Signs x48h











  Temp Pulse Resp BP


 


 05/20/22 06:30  36.7 C  88  14  129/92 H














- Physical Exam


General Appearance: positive: No acute distress, Alert


Eyes Bilateral: positive: PERRL, EOMI, No scleral icterus


ENT: positive: No signs of dehydration


Neck: positive: No JVD


Respiratory: positive: No respiratory distress, Breath sounds nml


Cardiovascular: positive: Regular rate & rhythm


Abdomen: positive: Non-tender, No distention


Neurologic/Psychiatric: positive: Oriented x3





Conclusion/Plan





- Problem List


(1) History of adenomatous polyp of colon


Conclusion/Plan: 


plan colonoscopy.  parq held and consent obtained








- Lab Results


Lab results reviewed: Yes

## 2022-07-25 ENCOUNTER — HOSPITAL ENCOUNTER (OUTPATIENT)
Dept: HOSPITAL 76 - DI | Age: 77
Discharge: HOME | End: 2022-07-25
Attending: PHYSICIAN ASSISTANT
Payer: MEDICARE

## 2022-07-25 DIAGNOSIS — Z77.29: ICD-10-CM

## 2022-07-25 DIAGNOSIS — I25.10: ICD-10-CM

## 2022-07-25 DIAGNOSIS — M48.56XA: Primary | ICD-10-CM

## 2022-07-25 DIAGNOSIS — R05.3: ICD-10-CM

## 2022-07-26 NOTE — CT REPORT
PROCEDURE:  CHEST WO

 

INDICATIONS:  RADON EXPOSURE, CHRONIC COUGH

 

TECHNIQUE: 

Noncontrast 1mm axial images were acquired from the pulmonary apices to the posterior costophrenic an
gles.  Axial 5 mm soft tissue kernel reconstructions were performed as well as 8 mm axial MIP and cor
onal and sagittal 5 mm reformations. For radiation dose reduction, the following was used: automate
d exposure control, adjustment of mA and/or kV according to patient size.

 

COMPARISON:  None

 

FINDINGS:  

Image quality:  Excellent.  

 

Lungs and pleura:  No acute air space opacities.  No pleural effusions or pneumothorax.  Central and 
peripheral airways are patent and normal in caliber.  

 

Mediastinum:  Heart size is normal.  No pericardial effusion. Moderate coronary artery calcifications
. No mediastinal adenopathy by size criteria.  Thoracic aorta and central pulmonary arteries are norm
al in size.  Esophagus is normal in caliber.  No hiatal hernia.  

 

Bones and chest wall:  No suspicious bony lesions. There is a moderate comminuted compression fractur
e of L1 which may potentially be acute or subacute. No axillary or supraclavicular adenopathy by size
 criteria.  The thyroid is normal in size and there are no incidental findings.

 

Abdomen:  Visualized upper abdominal solid organs and bowel loops appear normal in the absence of con
trast.  

 

IMPRESSION:  

 

1. No findings of pulmonary malignancy or acute pulmonary process or significant interstitial lung di
sease.

 

2. Moderate coronary artery calcifications.

 

3. Moderate comminuted L1 compression fracture, possibly acute or subacute.

 

Comment: Lumbar spine MRI may be helpful to evaluate the acuity of the lumbar compression fracture.

 

 

CLINICAL RECOMMENDATION STATEMENTS:

In patients <35 years with an ITN detected on CT, MRI, or extrathyroidal ultrasound, the Committee re
commends further evaluation with dedicated thyroid ultrasound if the nodule is "e1 cm and has no susp
icious imaging features, and if the patient has normal life expectancy.

In patients "e35 years with an ITN detected on CT, MRI, or extrathyroidal ultrasound, the Committee r
ecommends further evaluation with dedicated thyroid ultrasound if the nodule is "e1.5 cm and has no s
uspicious imaging features, and if the patient has normal life expectancy. (ACR, 2014)

 

Reviewed by: Mann Bee MD on 7/26/2022 9:14 AM PDT

Approved by: Mann Bee MD on 7/26/2022 9:14 AM PDT

 

 

Station ID:  [id number]

## 2022-09-06 ENCOUNTER — HOSPITAL ENCOUNTER (OUTPATIENT)
Dept: HOSPITAL 76 - LAB.S | Age: 77
Discharge: HOME | End: 2022-09-06
Attending: EMERGENCY MEDICINE
Payer: MEDICARE

## 2022-09-06 DIAGNOSIS — M70.51: Primary | ICD-10-CM

## 2022-09-06 LAB
CLARITY FLD: CLEAR
COLOR FLD: YELLOW
EOSINOPHIL NFR FLD: 1 %
LYMPHOCYTES NFR FLD: 11 %
MACROPHAGES # FLD: 25 %
MESOTHL CELL NFR FLD: 0 %
MONOCYTES NFR FLD: 22 %
NEUTROPHILS NFR FLD MANUAL: 41 %
RBC # FLD: 4000 /MM^3
SPECIMEN SOURCE FLD: (no result)
WBC OTHER NFR FLD: 161 /MM^3

## 2022-09-06 PROCEDURE — 87205 SMEAR GRAM STAIN: CPT

## 2022-09-06 PROCEDURE — 89051 BODY FLUID CELL COUNT: CPT

## 2022-09-06 PROCEDURE — 87070 CULTURE OTHR SPECIMN AEROBIC: CPT

## 2022-09-26 ENCOUNTER — HOSPITAL ENCOUNTER (EMERGENCY)
Dept: HOSPITAL 76 - ED | Age: 77
LOS: 1 days | Discharge: HOME | End: 2022-09-27
Payer: MEDICARE

## 2022-09-26 ENCOUNTER — HOSPITAL ENCOUNTER (OUTPATIENT)
Dept: HOSPITAL 76 - EMS | Age: 77
Discharge: LEFT BEFORE DISCHARGE | End: 2022-09-26
Payer: MEDICARE

## 2022-09-26 DIAGNOSIS — S01.81XA: Primary | ICD-10-CM

## 2022-09-26 DIAGNOSIS — I10: ICD-10-CM

## 2022-09-26 DIAGNOSIS — S01.21XA: ICD-10-CM
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
## 2022-09-26 DIAGNOSIS — S01.21XA: Primary | ICD-10-CM

## 2022-09-26 PROCEDURE — 12014 RPR F/E/E/N/L/M 5.1-7.5 CM: CPT

## 2022-09-26 PROCEDURE — 99281 EMR DPT VST MAYX REQ PHY/QHP: CPT

## 2022-09-27 VITALS — DIASTOLIC BLOOD PRESSURE: 83 MMHG | SYSTOLIC BLOOD PRESSURE: 159 MMHG

## 2022-09-27 NOTE — ED PHYSICIAN DOCUMENTATION
PD HPI HEAD INJURY





- Stated complaint


Stated Complaint: GLF,NOSE INJ,HIGH BP





- Chief complaint


Chief Complaint: Trauma Hd/Nk





- History obtained from


History obtained from: Patient





- Additional information


Additional information: 


Patient comes to the emergency department chief complaint of ground-level fall. 

He states he was walking his dogs when he believes he tripped.  He lurched 

forward and struck his face, sustaining lacerations over his nasal bridge in 

between his eyebrows from his glasses frame.  Patient states he does not take 

any anticoagulants.  He did not lose consciousness and has not had a headache.  

He denies any neck pain, rib pain, or hip pain.  He has been ambulatory and 

without pain in his extremities since the fall.  No other complaints at this 

time.








Review of Systems


Ten Systems: 10 systems reviewed and negative


Constitutional: reports: Reviewed and negative


Eyes: reports: Reviewed and negative


Ears: reports: Reviewed and negative


Nose: reports: Reviewed and negative


Throat: reports: Reviewed and negative


Cardiac: reports: Reviewed and negative


Respiratory: reports: Reviewed and negative


GI: reports: Reviewed and negative


: reports: Reviewed and negative


Skin: reports: Laceration (s)


Musculoskeletal: reports: Reviewed and negative


Neurologic: reports: Head injury


Psychiatric: reports: Reviewed and negative


Endocrine: reports: Reviewed and negative


Immunocompromised: reports: Reviewed and negative





PD PAST MEDICAL HISTORY





- Past Medical History


Past Medical History: Yes


Cardiovascular: Hypertension





- Past Surgical History


Past Surgical History: Yes





- Present Medications


Home Medications: 


                                Ambulatory Orders











 Medication  Instructions  Recorded  Confirmed


 


hydroCHLOROthiazide [Hydrodiuril] 25 mg PO DAILY 03/02/22 09/26/22


 


Losartan Potassium [Cozaar] 100 mg PO DAILY 09/26/22 09/26/22














- Allergies


Allergies/Adverse Reactions: 


                                    Allergies











Allergy/AdvReac Type Severity Reaction Status Date / Time


 


amoxicillin Allergy  Rash Verified 09/26/22 22:21














- Social History


Does the pt smoke?: No


Smoking Status: Never smoker


Does the pt drink ETOH?: No


Does the pt have substance abuse?: No





- Immunizations


Immunizations are current?: Yes





- POLST


Patient has POLST: No





PD ED PE NORMAL





- Vitals


Vital signs reviewed: Yes





- General


General: Alert and oriented X 3, No acute distress, Well developed/nourished





- HEENT


HEENT: PERRL, EOMI, Moist mucous membranes, Other (Curved laceration, 3 cm, 

between But not involving eyebrows; stellate laceration, total length 2.5 cm, 

over nasal bridge.  No deformity or step-off.Mild epistaxis.)





- Neck


Neck: Supple, no meningeal sign, No bony TTP





- Respiratory


Respiratory: No respiratory distress





- Derm


Derm: Normal color, Warm and dry, No rash, Other (Lacerations to face as above.)





- Extremities


Extremities: No deformity





- Neuro


Neuro: Alert and oriented X 3





- Psych


Psych: Normal mood, Normal affect





Results





- Vitals


Vitals: 


                               Vital Signs - 24 hr











  09/26/22 09/27/22





  22:15 01:37


 


Temperature 36.2 C L 36.3 C L


 


Heart Rate 88 78


 


Respiratory 16 18





Rate  


 


Blood Pressure 145/83 H 159/83 H


 


O2 Saturation 98 99








                                     Oxygen











O2 Source                      Room air

















Procedures





- Laceration (location)


  ** Forehead


Length in cm: 3


Wound type: Curved, Into subcut fat, Clean


Neurovascular status: Sensory intact, Vascular intact


Anesthesia: Lidocaine 1%


Wound preparation: Hibiclens, Irrigated copiously NS, Wound explored, To the 

base


Skin layer closure: Nylon, Interrupted, Size #-0 - enter number (5.0), Sutures -

 enter # (7)


Other: Patient tolerated well, No complications, Neurovascular intact, Dressing 

applied, Tetanus UTD





  ** Nasal bridge


Length in cm: 2.5


Wound type: Stellate, Into subcut fat


Neurovascular status: Sensory intact, Vascular intact


Anesthesia: Lidocaine 1%


Wound preparation: Hibiclens, Irrigated copiously NS, Wound explored, To the 

base


Skin layer closure: Nylon, Interrupted, Size #-0 - enter number (4.0), Sutures -

 enter # (4)


Other: Patient tolerated well, No complications, Neurovascular intact, Dressing 

applied





PD MEDICAL DECISION MAKING





- ED course


Complexity details: considered differential, d/w patient


ED course: 


The patient's lacerations were repaired as above.  We have discussed wound care 

at home, as well as the timeline for wound check and suture removal.  We have 

discussed the usual indications for return.








Departure





- Departure


Disposition: 01 Home, Self Care


Clinical Impression: 


Face lacerations


Qualifiers:


 Encounter type: initial encounter Qualified Code(s): S01.81XA - Laceration 

without foreign body of other part of head, initial encounter





Condition: Stable


Instructions:  ED Laceration Facial Sutr Tape


Comments: 


7 sutures have been placed in your forehead laceration and 4 sutures in your pau

al laceration.  They are all synthetic sutures, which will need to be removed by

 medical professional in 7 days.  You may allow water and soap to run over the 

wounds, but please do not rub, scrub, or immerse the wounds until the sutures 

are removed.  This is in order to prevent infection.  Please follow-up with your

 primary doctor or walk-in clinic to have the sutures removed.  You may also 

return to the emergency department for this if you cannot be seen in any of the 

other venues.


Discharge Date/Time: 09/27/22 01:39

## 2022-11-21 ENCOUNTER — HOSPITAL ENCOUNTER (OUTPATIENT)
Dept: HOSPITAL 76 - DI.S | Age: 77
Discharge: HOME | End: 2022-11-21
Attending: EMERGENCY MEDICINE
Payer: MEDICARE

## 2022-11-21 DIAGNOSIS — M25.521: Primary | ICD-10-CM

## 2022-11-21 NOTE — XRAY REPORT
PROCEDURE:  Elbow 3 View RT

 

INDICATIONS:  RIGHT ELBOW PAIN

 

TECHNIQUE:  3 views of the elbow were acquired.  

 

COMPARISON:  None

 

FINDINGS:  

Bones:  No fractures or dislocations.  No suspicious bony lesions.  

 

Soft tissues:  No elbow joint effusion.  No suspicious soft tissue calcifications.  

 

IMPRESSION:  

No visualized acute fracture or dislocation. However, occult injury cannot be excluded. Recommend isaiah
rt interval imaging follow-up in 7-10 days as clinically indicated for additional evaluation.

 

Reviewed by: Demetra Ojeda MD on 11/21/2022 3:38 PM PST

Approved by: Demetra Ojeda MD on 11/21/2022 3:38 PM PST

 

 

Station ID:  [id number]

## 2023-01-11 ENCOUNTER — HOSPITAL ENCOUNTER (OUTPATIENT)
Dept: HOSPITAL 76 - LAB | Age: 78
Discharge: HOME | End: 2023-01-11
Attending: EMERGENCY MEDICINE
Payer: MEDICARE

## 2023-01-11 DIAGNOSIS — M25.521: ICD-10-CM

## 2023-01-11 DIAGNOSIS — M70.21: Primary | ICD-10-CM

## 2023-01-11 LAB
CLARITY FLD: (no result)
COLOR FLD: (no result)
LYMPHOCYTES NFR FLD: 10 %
MACROPHAGES # FLD: 53 %
MESOTHL CELL NFR FLD: 1 %
MONOCYTES NFR FLD: 3 %
NEUTROPHILS NFR FLD MANUAL: 33 %
RBC # FLD: (no result) /MM^3
SPECIMEN SOURCE FLD: (no result)
WBC OTHER NFR FLD: 690 /MM^3

## 2023-01-11 PROCEDURE — 89051 BODY FLUID CELL COUNT: CPT

## 2023-01-11 PROCEDURE — 87070 CULTURE OTHR SPECIMN AEROBIC: CPT

## 2023-01-11 PROCEDURE — 87205 SMEAR GRAM STAIN: CPT

## 2023-01-11 PROCEDURE — 89060 EXAM SYNOVIAL FLUID CRYSTALS: CPT

## 2023-02-17 ENCOUNTER — HOSPITAL ENCOUNTER (OUTPATIENT)
Dept: HOSPITAL 76 - LAB.S | Age: 78
Discharge: HOME | End: 2023-02-17
Attending: PHYSICIAN ASSISTANT
Payer: MEDICARE

## 2023-02-17 DIAGNOSIS — E78.5: ICD-10-CM

## 2023-02-17 DIAGNOSIS — R53.83: ICD-10-CM

## 2023-02-17 DIAGNOSIS — I10: Primary | ICD-10-CM

## 2023-02-17 LAB
ALBUMIN DIAFP-MCNC: 3.9 G/DL (ref 3.2–5.5)
ALBUMIN/GLOB SERPL: 1.2 {RATIO} (ref 1–2.2)
ALP SERPL-CCNC: 42 IU/L (ref 42–121)
ALT SERPL W P-5'-P-CCNC: 22 IU/L (ref 10–60)
ANION GAP SERPL CALCULATED.4IONS-SCNC: 6 MMOL/L (ref 6–13)
AST SERPL W P-5'-P-CCNC: 22 IU/L (ref 10–42)
BASOPHILS NFR BLD AUTO: 0.1 10^3/UL (ref 0–0.1)
BASOPHILS NFR BLD AUTO: 1 %
BILIRUB BLD-MCNC: 1 MG/DL (ref 0.2–1)
BUN SERPL-MCNC: 14 MG/DL (ref 6–20)
CALCIUM UR-MCNC: 8.8 MG/DL (ref 8.5–10.3)
CHLORIDE SERPL-SCNC: 103 MMOL/L (ref 101–111)
CHOLEST SERPL-MCNC: 209 MG/DL
CO2 SERPL-SCNC: 27 MMOL/L (ref 21–32)
CREAT SERPLBLD-SCNC: 0.9 MG/DL (ref 0.6–1.2)
EOSINOPHIL # BLD AUTO: 0.1 10^3/UL (ref 0–0.7)
EOSINOPHIL NFR BLD AUTO: 2.3 %
ERYTHROCYTE [DISTWIDTH] IN BLOOD BY AUTOMATED COUNT: 13.2 % (ref 12–15)
GFRSERPLBLD MDRD-ARVRAT: 82 ML/MIN/{1.73_M2} (ref 89–?)
GLOBULIN SER-MCNC: 3.3 G/DL (ref 2.1–4.2)
GLUCOSE SERPL-MCNC: 94 MG/DL (ref 70–100)
HCT VFR BLD AUTO: 45.9 % (ref 42–52)
HDLC SERPL-MCNC: 60 MG/DL
HDLC SERPL: 3.5 {RATIO} (ref ?–5)
HGB UR QL STRIP: 14.9 G/DL (ref 14–18)
LDLC SERPL CALC-MCNC: 120 MG/DL
LDLC/HDLC SERPL: 2 {RATIO} (ref ?–3.6)
LYMPHOCYTES # SPEC AUTO: 1.4 10^3/UL (ref 1.5–3.5)
LYMPHOCYTES NFR BLD AUTO: 29.9 %
MCH RBC QN AUTO: 32.5 PG (ref 27–31)
MCHC RBC AUTO-ENTMCNC: 32.5 G/DL (ref 32–36)
MCV RBC AUTO: 100 FL (ref 80–94)
MONOCYTES # BLD AUTO: 0.4 10^3/UL (ref 0–1)
MONOCYTES NFR BLD AUTO: 7.5 %
NEUTROPHILS # BLD AUTO: 2.8 10^3/UL (ref 1.5–6.6)
NEUTROPHILS # SNV AUTO: 4.8 X10^3/UL (ref 4.8–10.8)
NEUTROPHILS NFR BLD AUTO: 59.1 %
NRBC # BLD AUTO: 0 /100WBC
NRBC # BLD AUTO: 0 X10^3/UL
PDW BLD AUTO: 10.4 FL (ref 7.4–11.4)
PLATELET # BLD: 200 10^3/UL (ref 130–450)
POTASSIUM SERPL-SCNC: 3.6 MMOL/L (ref 3.5–5)
PROT SPEC-MCNC: 7.2 G/DL (ref 6.7–8.2)
RBC MAR: 4.59 10^6/UL (ref 4.7–6.1)
SODIUM SERPLBLD-SCNC: 136 MMOL/L (ref 135–145)
TRIGL P FAST SERPL-MCNC: 143 MG/DL
TSH SERPL-ACNC: 3.11 UIU/ML (ref 0.34–5.6)
VLDLC SERPL-SCNC: 29 MG/DL

## 2023-02-17 PROCEDURE — 80061 LIPID PANEL: CPT

## 2023-02-17 PROCEDURE — 80053 COMPREHEN METABOLIC PANEL: CPT

## 2023-02-17 PROCEDURE — 36415 COLL VENOUS BLD VENIPUNCTURE: CPT

## 2023-02-17 PROCEDURE — 83721 ASSAY OF BLOOD LIPOPROTEIN: CPT

## 2023-02-17 PROCEDURE — 84403 ASSAY OF TOTAL TESTOSTERONE: CPT

## 2023-02-17 PROCEDURE — 84443 ASSAY THYROID STIM HORMONE: CPT

## 2023-02-17 PROCEDURE — 85025 COMPLETE CBC W/AUTO DIFF WBC: CPT

## 2024-04-04 ENCOUNTER — HOSPITAL ENCOUNTER (OUTPATIENT)
Dept: HOSPITAL 76 - LAB.S | Age: 79
Discharge: HOME | End: 2024-04-04
Attending: PHYSICIAN ASSISTANT
Payer: MEDICARE

## 2024-04-04 DIAGNOSIS — E78.5: ICD-10-CM

## 2024-04-04 DIAGNOSIS — R20.0: Primary | ICD-10-CM

## 2024-04-04 DIAGNOSIS — Z85.46: ICD-10-CM

## 2024-04-04 DIAGNOSIS — I10: ICD-10-CM

## 2024-04-04 LAB
ALBUMIN DIAFP-MCNC: 4.2 G/DL (ref 3.2–5.5)
ALBUMIN/GLOB SERPL: 1.4 {RATIO} (ref 1–2.2)
ALP SERPL-CCNC: 51 IU/L (ref 42–121)
ALT SERPL W P-5'-P-CCNC: 21 IU/L (ref 10–60)
ANION GAP SERPL CALCULATED.4IONS-SCNC: 4 MMOL/L (ref 6–13)
AST SERPL W P-5'-P-CCNC: 17 IU/L (ref 10–42)
BASOPHILS NFR BLD AUTO: 0 10^3/UL (ref 0–0.1)
BASOPHILS NFR BLD AUTO: 0.9 %
BILIRUB BLD-MCNC: 1 MG/DL (ref 0.2–1)
BUN SERPL-MCNC: 14 MG/DL (ref 6–20)
CALCIUM UR-MCNC: 9.8 MG/DL (ref 8.5–10.3)
CHLORIDE SERPL-SCNC: 106 MMOL/L (ref 101–111)
CHOLEST SERPL-MCNC: 226 MG/DL
CO2 SERPL-SCNC: 31 MMOL/L (ref 21–32)
CREAT SERPLBLD-SCNC: 0.8 MG/DL (ref 0.6–1.3)
EOSINOPHIL # BLD AUTO: 0.1 10^3/UL (ref 0–0.7)
EOSINOPHIL NFR BLD AUTO: 1.7 %
ERYTHROCYTE [DISTWIDTH] IN BLOOD BY AUTOMATED COUNT: 12.8 % (ref 12–15)
GFRSERPLBLD MDRD-ARVRAT: 93 ML/MIN/{1.73_M2} (ref 89–?)
GLOBULIN SER-MCNC: 3.1 G/DL (ref 2.1–4.2)
GLUCOSE SERPL-MCNC: 101 MG/DL (ref 74–104)
HCT VFR BLD AUTO: 47.9 % (ref 42–52)
HDLC SERPL-MCNC: 65 MG/DL
HDLC SERPL: 3.5 {RATIO} (ref ?–5)
HGB UR QL STRIP: 15.5 G/DL (ref 14–18)
LDLC SERPL CALC-MCNC: 141 MG/DL
LDLC/HDLC SERPL: 2.2 {RATIO} (ref ?–3.6)
LYMPHOCYTES # SPEC AUTO: 1.3 10^3/UL (ref 1.5–3.5)
LYMPHOCYTES NFR BLD AUTO: 28.8 %
MCH RBC QN AUTO: 32.2 PG (ref 27–31)
MCHC RBC AUTO-ENTMCNC: 32.4 G/DL (ref 32–36)
MCV RBC AUTO: 99.4 FL (ref 80–94)
MONOCYTES # BLD AUTO: 0.3 10^3/UL (ref 0–1)
MONOCYTES NFR BLD AUTO: 7.4 %
NEUTROPHILS # BLD AUTO: 2.8 10^3/UL (ref 1.5–6.6)
NEUTROPHILS # SNV AUTO: 4.6 X10^3/UL (ref 4.8–10.8)
NEUTROPHILS NFR BLD AUTO: 61 %
NRBC # BLD AUTO: 0 /100WBC
NRBC # BLD AUTO: 0 X10^3/UL
PDW BLD AUTO: 10.3 FL (ref 7.4–11.4)
PLATELET # BLD: 199 10^3/UL (ref 130–450)
POTASSIUM SERPL-SCNC: 4.4 MMOL/L (ref 3.5–4.5)
PROT SPEC-MCNC: 7.3 G/DL (ref 6.4–8.9)
RBC MAR: 4.82 10^6/UL (ref 4.7–6.1)
SODIUM SERPLBLD-SCNC: 141 MMOL/L (ref 135–145)
TRIGL P FAST SERPL-MCNC: 100 MG/DL (ref 48–352)
TSH SERPL-ACNC: 2.97 UIU/ML (ref 0.34–5.6)
VLDLC SERPL-SCNC: 20 MG/DL

## 2024-04-04 PROCEDURE — 83721 ASSAY OF BLOOD LIPOPROTEIN: CPT

## 2024-04-04 PROCEDURE — 84153 ASSAY OF PSA TOTAL: CPT

## 2024-04-04 PROCEDURE — 36415 COLL VENOUS BLD VENIPUNCTURE: CPT

## 2024-04-04 PROCEDURE — 80061 LIPID PANEL: CPT

## 2024-04-04 PROCEDURE — 84443 ASSAY THYROID STIM HORMONE: CPT

## 2024-04-04 PROCEDURE — 80053 COMPREHEN METABOLIC PANEL: CPT

## 2024-04-04 PROCEDURE — 82607 VITAMIN B-12: CPT

## 2024-04-04 PROCEDURE — 85025 COMPLETE CBC W/AUTO DIFF WBC: CPT

## 2024-07-02 ENCOUNTER — HOSPITAL ENCOUNTER (OUTPATIENT)
Dept: HOSPITAL 76 - LAB.S | Age: 79
Discharge: HOME | End: 2024-07-02
Attending: PHYSICIAN ASSISTANT
Payer: MEDICARE

## 2024-07-02 DIAGNOSIS — E53.8: Primary | ICD-10-CM

## 2024-07-02 PROCEDURE — 82746 ASSAY OF FOLIC ACID SERUM: CPT

## 2024-07-02 PROCEDURE — 86340 INTRINSIC FACTOR ANTIBODY: CPT

## 2024-07-02 PROCEDURE — 36415 COLL VENOUS BLD VENIPUNCTURE: CPT

## 2024-07-02 PROCEDURE — 82607 VITAMIN B-12: CPT

## 2024-09-02 ENCOUNTER — HOSPITAL ENCOUNTER (OUTPATIENT)
Dept: HOSPITAL 76 - LAB.R | Age: 79
Discharge: HOME | End: 2024-09-02
Attending: PHYSICIAN ASSISTANT
Payer: MEDICARE

## 2024-09-02 DIAGNOSIS — E53.8: Primary | ICD-10-CM

## 2024-09-02 PROCEDURE — 83993 ASSAY FOR CALPROTECTIN FECAL: CPT

## 2024-09-02 PROCEDURE — 87338 HPYLORI STOOL AG IA: CPT

## 2024-09-03 LAB — H PYLORI AG STL QL IA.RAPID: NEGATIVE
